# Patient Record
Sex: MALE | Race: ASIAN | HISPANIC OR LATINO | Employment: FULL TIME | URBAN - METROPOLITAN AREA
[De-identification: names, ages, dates, MRNs, and addresses within clinical notes are randomized per-mention and may not be internally consistent; named-entity substitution may affect disease eponyms.]

---

## 2024-11-14 ENCOUNTER — OFFICE VISIT (OUTPATIENT)
Age: 34
End: 2024-11-14

## 2024-11-14 DIAGNOSIS — H57.11 EYE DISCOMFORT, RIGHT: Primary | ICD-10-CM

## 2024-11-14 ASSESSMENT — ENCOUNTER SYMPTOMS
EYE REDNESS: 1
VOMITING: 0
EYE ITCHING: 0
EYE DISCHARGE: 0
EYE PAIN: 1
PHOTOPHOBIA: 0
BLURRED VISION: 1
DOUBLE VISION: 0
CHEST TIGHTNESS: 0
FOREIGN BODY SENSATION: 0
SHORTNESS OF BREATH: 0
NAUSEA: 0

## 2024-11-14 NOTE — PROGRESS NOTES
PROGRESS NOTE    SUBJECTIVE:   Jesu Thorne is a 34 y.o. male [facility] for:     Chief Complaint    Eye Problem         Client presents with complaints of right eye pain. 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 He reports that 2 weeks ago he was working outside and something got into his right eye.He used eye drops and his eye felt better. He had some blurred vision then but he has not had any lately. He feels that he needs to have his eye checked out. He denies any problems today and he states that his vision is normal. He denies any blurred vision, nystagmus or vision problems today. He has not used any eye drops or treatments in his eye. He denies any other complaints or concerns at this time.      Eye Problem   The right eye is affected. This is a new problem. The current episode started 1 to 4 weeks ago. The problem has been unchanged. The injury mechanism was a foreign body. The patient is experiencing no pain. There is No known exposure to pink eye. He Does not wear contacts. Associated symptoms include blurred vision and eye redness. Pertinent negatives include no eye discharge, double vision, fever, foreign body sensation, itching, nausea, photophobia, recent URI, vomiting or weakness. He has tried nothing for the symptoms.     No current outpatient medications on file.     No current facility-administered medications for this visit.      No Known Allergies    Social History     Tobacco Use    Smoking status: Never    Smokeless tobacco: Never   Vaping Use    Vaping status: Never Used   Substance Use Topics    Alcohol use: Never    Drug use: Never        Review of Systems   Constitutional:  Negative for fever.   Eyes:  Positive for blurred vision, pain and redness. Negative for double vision, photophobia, discharge, itching and visual disturbance.   Respiratory:  Negative for chest tightness and shortness of breath.